# Patient Record
Sex: MALE | Race: WHITE | Employment: FULL TIME | URBAN - NONMETROPOLITAN AREA
[De-identification: names, ages, dates, MRNs, and addresses within clinical notes are randomized per-mention and may not be internally consistent; named-entity substitution may affect disease eponyms.]

---

## 2019-12-07 ENCOUNTER — HOSPITAL ENCOUNTER (OUTPATIENT)
Age: 59
Discharge: HOME | End: 2019-12-07
Payer: COMMERCIAL

## 2019-12-07 ENCOUNTER — HOSPITAL ENCOUNTER (OUTPATIENT)
Age: 59
Discharge: HOME | End: 2019-12-09
Payer: COMMERCIAL

## 2019-12-07 ENCOUNTER — HOSPITAL ENCOUNTER (OUTPATIENT)
Dept: GENERAL RADIOLOGY | Age: 59
Discharge: HOME | End: 2019-12-09
Payer: COMMERCIAL

## 2019-12-07 DIAGNOSIS — Z01.818 PREOP EXAMINATION: ICD-10-CM

## 2019-12-07 LAB
ABSOLUTE EOS #: 0.42 K/UL (ref 0–0.44)
ABSOLUTE IMMATURE GRANULOCYTE: 0.03 K/UL (ref 0–0.3)
ABSOLUTE LYMPH #: 3.62 K/UL (ref 1.1–3.7)
ABSOLUTE MONO #: 0.64 K/UL (ref 0.1–1.2)
ANION GAP SERPL CALCULATED.3IONS-SCNC: 13 MMOL/L (ref 9–17)
BASOPHILS # BLD: 1 % (ref 0–2)
BASOPHILS ABSOLUTE: 0.13 K/UL (ref 0–0.2)
BUN BLDV-MCNC: 15 MG/DL (ref 6–20)
BUN/CREAT BLD: 19 (ref 9–20)
CALCIUM SERPL-MCNC: 9.5 MG/DL (ref 8.6–10.4)
CHLORIDE BLD-SCNC: 98 MMOL/L (ref 98–107)
CO2: 24 MMOL/L (ref 20–31)
CREAT SERPL-MCNC: 0.77 MG/DL (ref 0.7–1.2)
DIFFERENTIAL TYPE: ABNORMAL
EKG ATRIAL RATE: 71 BPM
EKG P AXIS: 54 DEGREES
EKG P-R INTERVAL: 152 MS
EKG Q-T INTERVAL: 384 MS
EKG QRS DURATION: 88 MS
EKG QTC CALCULATION (BAZETT): 417 MS
EKG R AXIS: 21 DEGREES
EKG T AXIS: 59 DEGREES
EKG VENTRICULAR RATE: 71 BPM
EOSINOPHILS RELATIVE PERCENT: 5 % (ref 1–4)
ESTIMATED AVERAGE GLUCOSE: 166 MG/DL
GFR AFRICAN AMERICAN: >60 ML/MIN
GFR NON-AFRICAN AMERICAN: >60 ML/MIN
GFR SERPL CREATININE-BSD FRML MDRD: ABNORMAL ML/MIN/{1.73_M2}
GFR SERPL CREATININE-BSD FRML MDRD: ABNORMAL ML/MIN/{1.73_M2}
GLUCOSE BLD-MCNC: 137 MG/DL (ref 70–99)
HBA1C MFR BLD: 7.4 % (ref 4.8–5.9)
HCT VFR BLD CALC: 48.6 % (ref 40.7–50.3)
HEMOGLOBIN: 16.1 G/DL (ref 13–17)
IMMATURE GRANULOCYTES: 0 %
LYMPHOCYTES # BLD: 40 % (ref 24–43)
MCH RBC QN AUTO: 31.2 PG (ref 25.2–33.5)
MCHC RBC AUTO-ENTMCNC: 33.1 G/DL (ref 28.4–34.8)
MCV RBC AUTO: 94.2 FL (ref 82.6–102.9)
MONOCYTES # BLD: 7 % (ref 3–12)
NRBC AUTOMATED: 0 PER 100 WBC
PDW BLD-RTO: 12.2 % (ref 11.8–14.4)
PLATELET # BLD: 275 K/UL (ref 138–453)
PLATELET ESTIMATE: ABNORMAL
PMV BLD AUTO: 9.4 FL (ref 8.1–13.5)
POTASSIUM SERPL-SCNC: 4.4 MMOL/L (ref 3.7–5.3)
RBC # BLD: 5.16 M/UL (ref 4.21–5.77)
RBC # BLD: ABNORMAL 10*6/UL
SEG NEUTROPHILS: 47 % (ref 36–65)
SEGMENTED NEUTROPHILS ABSOLUTE COUNT: 4.28 K/UL (ref 1.5–8.1)
SODIUM BLD-SCNC: 135 MMOL/L (ref 135–144)
WBC # BLD: 9.1 K/UL (ref 3.5–11.3)
WBC # BLD: ABNORMAL 10*3/UL

## 2019-12-07 PROCEDURE — 93005 ELECTROCARDIOGRAM TRACING: CPT | Performed by: FAMILY MEDICINE

## 2019-12-07 PROCEDURE — 93010 ELECTROCARDIOGRAM REPORT: CPT | Performed by: INTERNAL MEDICINE

## 2019-12-07 PROCEDURE — 83036 HEMOGLOBIN GLYCOSYLATED A1C: CPT

## 2019-12-07 PROCEDURE — 71046 X-RAY EXAM CHEST 2 VIEWS: CPT

## 2019-12-07 PROCEDURE — 36415 COLL VENOUS BLD VENIPUNCTURE: CPT

## 2019-12-07 PROCEDURE — 85025 COMPLETE CBC W/AUTO DIFF WBC: CPT

## 2019-12-07 PROCEDURE — 80048 BASIC METABOLIC PNL TOTAL CA: CPT

## 2020-12-21 PROBLEM — Z09 POSTOP CHECK: Status: ACTIVE | Noted: 2020-12-21

## 2021-01-20 PROBLEM — Z09 POSTOP CHECK: Status: RESOLVED | Noted: 2020-12-21 | Resolved: 2021-01-20

## 2021-03-19 PROBLEM — N13.8 BPH WITH OBSTRUCTION/LOWER URINARY TRACT SYMPTOMS: Status: ACTIVE | Noted: 2021-03-19

## 2021-03-19 PROBLEM — N40.1 BPH WITH OBSTRUCTION/LOWER URINARY TRACT SYMPTOMS: Status: ACTIVE | Noted: 2021-03-19

## 2021-03-19 PROBLEM — R31.0 GROSS HEMATURIA: Status: ACTIVE | Noted: 2021-03-19

## 2023-07-27 PROBLEM — R14.0 ABDOMINAL DISTENSION (GASEOUS): Status: ACTIVE | Noted: 2023-07-27

## 2023-08-07 ENCOUNTER — TELEPHONE (OUTPATIENT)
Dept: UROLOGY | Age: 63
End: 2023-08-07

## 2023-08-07 NOTE — TELEPHONE ENCOUNTER
Left message for patient to advise of results of urine culture. Advised patient to call the office if he has further questions.

## 2023-08-07 NOTE — TELEPHONE ENCOUNTER
----- Message from NAVJOT Henson - CNP sent at 8/7/2023  9:06 AM EDT -----  Call pt - urine cx reviewed and negative for UTI & for significant microhematuria

## 2025-05-19 ENCOUNTER — RESULTS FOLLOW-UP (OUTPATIENT)
Dept: UROLOGY | Age: 65
End: 2025-05-19

## 2025-05-19 NOTE — TELEPHONE ENCOUNTER
----- Message from Mynor Beltre PA-C sent at 5/19/2025 10:25 AM EDT -----  Please let him know there is no significant blood in his urine.  There was also no urinary tract infection

## 2025-05-19 NOTE — RESULT ENCOUNTER NOTE
Please let him know there is no significant blood in his urine.  There was also no urinary tract infection